# Patient Record
Sex: MALE | Race: WHITE | Employment: UNEMPLOYED
[De-identification: names, ages, dates, MRNs, and addresses within clinical notes are randomized per-mention and may not be internally consistent; named-entity substitution may affect disease eponyms.]

---

## 2023-07-30 ENCOUNTER — APPOINTMENT (OUTPATIENT)
Facility: HOSPITAL | Age: 28
End: 2023-07-30
Payer: MEDICAID

## 2023-07-30 ENCOUNTER — HOSPITAL ENCOUNTER (EMERGENCY)
Facility: HOSPITAL | Age: 28
Discharge: HOME | End: 2023-07-30
Attending: STUDENT IN AN ORGANIZED HEALTH CARE EDUCATION/TRAINING PROGRAM
Payer: MEDICAID

## 2023-07-30 VITALS
WEIGHT: 182.32 LBS | BODY MASS INDEX: 29.3 KG/M2 | SYSTOLIC BLOOD PRESSURE: 134 MMHG | HEIGHT: 66 IN | HEART RATE: 75 BPM | RESPIRATION RATE: 18 BRPM | DIASTOLIC BLOOD PRESSURE: 72 MMHG | OXYGEN SATURATION: 93 % | TEMPERATURE: 97.3 F

## 2023-07-30 DIAGNOSIS — Z83.1: ICD-10-CM

## 2023-07-30 DIAGNOSIS — S01.01XA LACERATION OF SCALP, INITIAL ENCOUNTER: ICD-10-CM

## 2023-07-30 DIAGNOSIS — S09.90XA INJURY OF HEAD, INITIAL ENCOUNTER: Primary | ICD-10-CM

## 2023-07-30 PROCEDURE — 2500000003 HC RX 250 WO HCPCS

## 2023-07-30 PROCEDURE — 70450 CT HEAD/BRAIN W/O DYE: CPT

## 2023-07-30 PROCEDURE — 90471 IMMUNIZATION ADMIN: CPT

## 2023-07-30 PROCEDURE — 96360 HYDRATION IV INFUSION INIT: CPT

## 2023-07-30 PROCEDURE — 96361 HYDRATE IV INFUSION ADD-ON: CPT

## 2023-07-30 PROCEDURE — 6370000000 HC RX 637 (ALT 250 FOR IP)

## 2023-07-30 PROCEDURE — 90714 TD VACC NO PRESV 7 YRS+ IM: CPT

## 2023-07-30 PROCEDURE — 2580000003 HC RX 258

## 2023-07-30 PROCEDURE — 6360000002 HC RX W HCPCS

## 2023-07-30 PROCEDURE — 12031 INTMD RPR S/A/T/EXT 2.5 CM/<: CPT

## 2023-07-30 PROCEDURE — 99284 EMERGENCY DEPT VISIT MOD MDM: CPT

## 2023-07-30 RX ORDER — IBUPROFEN 600 MG/1
600 TABLET ORAL
Status: COMPLETED | OUTPATIENT
Start: 2023-07-30 | End: 2023-07-30

## 2023-07-30 RX ORDER — 0.9 % SODIUM CHLORIDE 0.9 %
1000 INTRAVENOUS SOLUTION INTRAVENOUS ONCE
Status: COMPLETED | OUTPATIENT
Start: 2023-07-30 | End: 2023-07-30

## 2023-07-30 RX ORDER — LIDOCAINE HYDROCHLORIDE AND EPINEPHRINE 10; 10 MG/ML; UG/ML
20 INJECTION, SOLUTION INFILTRATION; PERINEURAL
Status: COMPLETED | OUTPATIENT
Start: 2023-07-30 | End: 2023-07-30

## 2023-07-30 RX ORDER — GINSENG 100 MG
CAPSULE ORAL
Status: COMPLETED | OUTPATIENT
Start: 2023-07-30 | End: 2023-07-30

## 2023-07-30 RX ORDER — TETANUS AND DIPHTHERIA TOXOIDS ADSORBED 2; 2 [LF]/.5ML; [LF]/.5ML
0.5 INJECTION INTRAMUSCULAR ONCE
Status: COMPLETED | OUTPATIENT
Start: 2023-07-30 | End: 2023-07-30

## 2023-07-30 RX ADMIN — Medication: at 17:19

## 2023-07-30 RX ADMIN — TETANUS AND DIPHTHERIA TOXOIDS ADSORBED 0.5 ML: 2; 2 INJECTION INTRAMUSCULAR at 14:33

## 2023-07-30 RX ADMIN — SODIUM CHLORIDE 1000 ML: 9 INJECTION, SOLUTION INTRAVENOUS at 14:48

## 2023-07-30 RX ADMIN — IBUPROFEN 600 MG: 600 TABLET, FILM COATED ORAL at 14:33

## 2023-07-30 RX ADMIN — LIDOCAINE HYDROCHLORIDE,EPINEPHRINE BITARTRATE 20 ML: 10; .01 INJECTION, SOLUTION INFILTRATION; PERINEURAL at 16:22

## 2023-07-30 ASSESSMENT — PAIN - FUNCTIONAL ASSESSMENT: PAIN_FUNCTIONAL_ASSESSMENT: 0-10

## 2023-07-30 ASSESSMENT — PAIN SCALES - GENERAL
PAINLEVEL_OUTOF10: 5
PAINLEVEL_OUTOF10: 5

## 2023-07-30 ASSESSMENT — PAIN DESCRIPTION - LOCATION: LOCATION: HEAD

## 2023-07-30 ASSESSMENT — ENCOUNTER SYMPTOMS
NAUSEA: 1
ABDOMINAL PAIN: 0

## 2023-07-30 ASSESSMENT — PAIN DESCRIPTION - ORIENTATION: ORIENTATION: POSTERIOR

## 2023-07-30 NOTE — DISCHARGE INSTRUCTIONS
You have been seen today for head injury and laceration. You received 5 staples to the back of your head. Please present back to this department or to your primary care provider in approximately 7 to 10 days to have the staples removed. Please keep your wound clean and dry. You may put antibiotic ointment on the wound but do not cover the wound with a Band-Aid as this will be difficult due to location. Should your symptoms fail to improve or worsen please present back to this emergency department. Please also follow-up with your primary care within 7 to 10 days for reevaluation of wound and staple removal.  Your CT imaging of the head was reassuring and did not show acute injury from your fall today. I suspect that your nausea was likely due to recent exposure to GI virus. Please stay well-hydrated at home with water and electrolytes. Please also stick to a gentle GI diet consisting of bananas, rice, applesauce and toast if you are experiencing GI virus symptoms.

## 2023-07-30 NOTE — ED PROVIDER NOTES
management. REASSESSMENT     ED Course as of 07/30/23 1715   Sun Jul 30, 2023   1628 CT head resulted and negative for acute fracture or trauma.  Will proceed with lac repair []      ED Course User Index  [JH] Jaime Beyer PA-C           CONSULTS:  None    PROCEDURES:  Unless otherwise noted below, none     Lac Repair    Date/Time: 7/30/2023 5:07 PM  Performed by: Jaime Beyer PA-C  Authorized by: Michael Dumont MD     Consent:     Consent obtained:  Verbal    Consent given by:  Patient    Risks, benefits, and alternatives were discussed: yes      Risks discussed:  Infection, need for additional repair, poor cosmetic result, pain, retained foreign body, tendon damage, vascular damage, poor wound healing and nerve damage    Alternatives discussed:  No treatment  Universal protocol:     Imaging studies available: yes      Patient identity confirmed:  Verbally with patient  Anesthesia:     Anesthesia method:  Local infiltration    Local anesthetic:  Lidocaine 1% WITH epi  Laceration details:     Location:  Scalp    Scalp location:  Crown    Length (cm):  4    Depth (mm):  10  Exploration:     Limited defect created (wound extended): no      Hemostasis achieved with:  Direct pressure    Imaging obtained: x-ray      Imaging obtained comment:  CT    Imaging outcome: foreign body not noted      Wound exploration: wound explored through full range of motion and entire depth of wound visualized      Wound extent: no areolar tissue violation noted, no fascia violation noted, no foreign bodies/material noted, no muscle damage noted, no nerve damage noted, no tendon damage noted, no underlying fracture noted and no vascular damage noted      Contaminated: yes    Treatment:     Area cleansed with:  Saline and povidone-iodine    Amount of cleaning:  Extensive    Irrigation solution:  Sterile water    Irrigation volume:  250mL    Irrigation method:  Syringe    Visualized foreign bodies/material removed: no

## 2023-07-30 NOTE — ED NOTES
Patient stable at time of discharge. Reviewed discharge instructions and follow up. Educated on wound care at home. Allowed time for questions. Patient verbalized understanding. Ambulatory out of department with steady gait accompanied by family.       Gissell Akhtar RN  07/30/23 [id number]

## 2023-07-30 NOTE — ED TRIAGE NOTES
Patient presents to ER with complaints of head laceration. Patient began feeling nauseated during hair cut got up to run to the bathroom and hit head on bathroom cabinet which caused fall and hit head on radiator. No LOC. Incident was witnessed by family. No blood thinners. Endorses headache at this time. Patient reports still feeling nauseous.  Patient donated plasma this morning which he has done in the past.

## 2023-08-08 ENCOUNTER — HOSPITAL ENCOUNTER (EMERGENCY)
Facility: HOSPITAL | Age: 28
Discharge: HOME | End: 2023-08-08
Attending: DENTIST

## 2023-08-08 VITALS
OXYGEN SATURATION: 99 % | DIASTOLIC BLOOD PRESSURE: 83 MMHG | HEART RATE: 63 BPM | SYSTOLIC BLOOD PRESSURE: 125 MMHG | TEMPERATURE: 97.3 F | RESPIRATION RATE: 18 BRPM

## 2023-08-08 DIAGNOSIS — Z48.02 ENCOUNTER FOR STAPLE REMOVAL: Primary | ICD-10-CM

## 2023-08-08 NOTE — ED PROVIDER NOTES
[facility] EM[facility]Y [facility]  EMERGENCY DEPARTMENT ENCOUNTER      Pt Name: Lasha Yanez  MRN: [id number]  [address] [place] 1995  Date of evaluation: 8/8/2023  Provider: Colonel Sophie PA-C    CHIEF COMPLAINT       Chief Complaint   Patient presents with    Suture / Staple Removal         HISTORY OF PRESENT ILLNESS    Patient is a 32 y.o. male with no known past medical history who presents to the ER with reports of needing his staples removed from 7/30/2023. Patient reports falling and hitting his head on a metal vent on the floor. Patient had 5 staples to close a wound on the crown of his head. Patient denies any issues or problems with the sutures. Patient has been cleaning the area at home as instructed. Patient denies any drainage or additional bleeding. Patient reports he has been doing better after being seen in the ER with no additional falls, headache, or dizziness. Patient denies chest pain, shortness of breath, fever, abdominal pain, nausea or vomiting, diarrhea or constipation. Patient was told to return for suture removal in 7-10 days. Patient admits to occasional alcohol use, denies vaping/smoking or illicit drug use. Nursing Notes were reviewed. REVIEW OF SYSTEMS       Review of Systems   Skin:  Positive for wound. All other systems reviewed and are negative. PAST MEDICAL HISTORY   No past medical history on file. SURGICAL HISTORY       Past Surgical History:   Procedure Laterality Date    HERNIA REPAIR           CURRENT MEDICATIONS       Previous Medications    No medications on file       ALLERGIES     Patient has no known allergies. FAMILY HISTORY     No family history on file.        SOCIAL HISTORY       Social History     Socioeconomic History    Marital status: Single   Tobacco Use    Smoking status: Never    Smokeless tobacco: Never   Vaping Use    Vaping Use: Never used   Substance and Sexual Activity    Alcohol use: Yes     Comment: occasionally    Drug use: Never       PHYSICAL

## 2023-08-08 NOTE — ED NOTES
The patient left the Emergency Department ambulatory, alert and oriented and in no acute distress. The patient was encouraged to call or return to the ED for worsening issues or problems and was encouraged to schedule a follow up appointment for continuing care. The patient verbalized understanding of discharge instructions and  all questions were answered. The patient has no further concerns at this time.          Mary Bustamante RN  08/08/23 3663

## 2023-08-08 NOTE — ED TRIAGE NOTES
Patient arrives w cc of staple removal to the back of head. Patient denies fevers, redness, or discharge at this time.  Patient is A & O x 4 and ambulatory to room

## 2023-08-08 NOTE — DISCHARGE INSTRUCTIONS
Discussed visit today. Recommended trying not to scratch the back of your head because there are scabbed over areas. Continue to wash your hair and be careful with the area. Return to the ER with any worsening of symptoms.

## 2023-10-24 ENCOUNTER — TELEPHONE (OUTPATIENT)
Age: 28
End: 2023-10-24

## 2023-10-24 NOTE — TELEPHONE ENCOUNTER
Spoke with Cony Pereira at [facility]. Says they will need prior authorization faxed over for medication [employer] @ [phone].  Best call back number [phone]

## 2023-10-25 NOTE — TELEPHONE ENCOUNTER
We are not Patients PCP and will not be doing a prior auth. Also medication is indicated for people 72 and older.

## 2023-11-21 ENCOUNTER — HOSPITAL ENCOUNTER (EMERGENCY)
Facility: HOSPITAL | Age: 28
Discharge: HOME | End: 2023-11-21
Attending: STUDENT IN AN ORGANIZED HEALTH CARE EDUCATION/TRAINING PROGRAM
Payer: MEDICAID

## 2023-11-21 VITALS
WEIGHT: 176.37 LBS | OXYGEN SATURATION: 98 % | DIASTOLIC BLOOD PRESSURE: 64 MMHG | SYSTOLIC BLOOD PRESSURE: 119 MMHG | HEART RATE: 85 BPM | RESPIRATION RATE: 16 BRPM | TEMPERATURE: 98.9 F | BODY MASS INDEX: 28.47 KG/M2

## 2023-11-21 DIAGNOSIS — L25.9 CONTACT DERMATITIS, UNSPECIFIED CONTACT DERMATITIS TYPE, UNSPECIFIED TRIGGER: Primary | ICD-10-CM

## 2023-11-21 PROCEDURE — 99283 EMERGENCY DEPT VISIT LOW MDM: CPT

## 2023-11-21 RX ORDER — PREDNISONE 10 MG/1
TABLET ORAL
Qty: 59 TABLET | Refills: 0 | Status: SHIPPED | OUTPATIENT
Start: 2023-11-21 | End: 2023-12-06

## 2023-11-21 ASSESSMENT — PAIN - FUNCTIONAL ASSESSMENT: PAIN_FUNCTIONAL_ASSESSMENT: NONE - DENIES PAIN

## 2023-11-21 ASSESSMENT — ENCOUNTER SYMPTOMS
WHEEZING: 0
SHORTNESS OF BREATH: 0

## 2023-11-21 NOTE — ED PROVIDER NOTES
[facility] EMERGENCY CTR  EMERGENCY DEPARTMENT ENCOUNTER      Pt Name: Nikole Mathis  MRN: [id number]  [address] 1995  Date of evaluation: 11/21/2023  Provider: Arun Duncan       Chief Complaint   Patient presents with    Rash         HISTORY OF PRESENT ILLNESS   (Location/Symptom, Timing/Onset, Context/Setting, Quality, Duration, Modifying Factors, Severity)  Note limiting factors. 25-year-old male presents today with rash. This started several days ago and has gotten progressively worse. He has it to the left forearm, torso, face. Denies trouble seeing or pain in the eyes. Has had some crusting and drainage from the lesions on his left forearm. He does note that he hit that lesion on a brick wall though. Tried calamine lotion without much improvement. No trouble breathing. No fevers. He believes he got exposed to poison ivy while working in the yard the other day. Review of External Medical Records:     Nursing Notes were reviewed. REVIEW OF SYSTEMS    (2-9 systems for level 4, 10 or more for level 5)     Review of Systems   Constitutional:  Negative for fever. Respiratory:  Negative for shortness of breath and wheezing. Skin:  Positive for rash. Except as noted above the remainder of the review of systems was reviewed and negative. PAST MEDICAL HISTORY   History reviewed. No pertinent past medical history. SURGICAL HISTORY       Past Surgical History:   Procedure Laterality Date    HERNIA REPAIR           CURRENT MEDICATIONS       Previous Medications    No medications on file       ALLERGIES     Patient has no known allergies. FAMILY HISTORY     History reviewed. No pertinent family history.        SOCIAL HISTORY       Social History     Socioeconomic History    Marital status: Single     Spouse name: None    Number of children: None    Years of education: None    Highest education level: None   Tobacco Use    Smoking status:

## 2023-11-21 NOTE — ED TRIAGE NOTES
Pt arrives with rash to face and arms and chest x 2 days. Pt has been working outside in the yard, possible exposure to poison ivy.